# Patient Record
Sex: FEMALE | Race: WHITE | NOT HISPANIC OR LATINO | URBAN - METROPOLITAN AREA
[De-identification: names, ages, dates, MRNs, and addresses within clinical notes are randomized per-mention and may not be internally consistent; named-entity substitution may affect disease eponyms.]

---

## 2021-07-04 ENCOUNTER — EMERGENCY (EMERGENCY)
Facility: HOSPITAL | Age: 21
LOS: 1 days | Discharge: HOME | End: 2021-07-04
Attending: EMERGENCY MEDICINE | Admitting: EMERGENCY MEDICINE
Payer: COMMERCIAL

## 2021-07-04 VITALS
OXYGEN SATURATION: 99 % | HEART RATE: 67 BPM | TEMPERATURE: 98 F | SYSTOLIC BLOOD PRESSURE: 100 MMHG | RESPIRATION RATE: 18 BRPM | DIASTOLIC BLOOD PRESSURE: 62 MMHG

## 2021-07-04 LAB
APPEARANCE UR: CLEAR — SIGNIFICANT CHANGE UP
BACTERIA # UR AUTO: NEGATIVE — SIGNIFICANT CHANGE UP
BILIRUB UR-MCNC: NEGATIVE — SIGNIFICANT CHANGE UP
COLOR SPEC: YELLOW — SIGNIFICANT CHANGE UP
DIFF PNL FLD: NEGATIVE — SIGNIFICANT CHANGE UP
EPI CELLS # UR: 1 /HPF — SIGNIFICANT CHANGE UP (ref 0–5)
GLUCOSE UR QL: NEGATIVE — SIGNIFICANT CHANGE UP
HIV 1+2 AB+HIV1 P24 AG SERPL QL IA: SIGNIFICANT CHANGE UP
HYALINE CASTS # UR AUTO: 0 /LPF — SIGNIFICANT CHANGE UP (ref 0–7)
KETONES UR-MCNC: NEGATIVE — SIGNIFICANT CHANGE UP
LEUKOCYTE ESTERASE UR-ACNC: ABNORMAL
NITRITE UR-MCNC: NEGATIVE — SIGNIFICANT CHANGE UP
PH UR: 6.5 — SIGNIFICANT CHANGE UP (ref 5–8)
PROT UR-MCNC: NEGATIVE — SIGNIFICANT CHANGE UP
RBC CASTS # UR COMP ASSIST: 3 /HPF — SIGNIFICANT CHANGE UP (ref 0–4)
SP GR SPEC: 1.01 — LOW (ref 1.01–1.02)
UROBILINOGEN FLD QL: SIGNIFICANT CHANGE UP
WBC UR QL: 4 /HPF — SIGNIFICANT CHANGE UP (ref 0–5)

## 2021-07-04 PROCEDURE — 99284 EMERGENCY DEPT VISIT MOD MDM: CPT

## 2021-07-04 RX ORDER — FLUCONAZOLE 150 MG/1
150 TABLET ORAL ONCE
Refills: 0 | Status: COMPLETED | OUTPATIENT
Start: 2021-07-04 | End: 2021-07-04

## 2021-07-04 RX ORDER — CEFTRIAXONE 500 MG/1
500 INJECTION, POWDER, FOR SOLUTION INTRAMUSCULAR; INTRAVENOUS ONCE
Refills: 0 | Status: COMPLETED | OUTPATIENT
Start: 2021-07-04 | End: 2021-07-04

## 2021-07-04 RX ADMIN — FLUCONAZOLE 150 MILLIGRAM(S): 150 TABLET ORAL at 15:22

## 2021-07-04 RX ADMIN — Medication 100 MILLIGRAM(S): at 15:15

## 2021-07-04 RX ADMIN — CEFTRIAXONE 500 MILLIGRAM(S): 500 INJECTION, POWDER, FOR SOLUTION INTRAMUSCULAR; INTRAVENOUS at 15:22

## 2021-07-04 NOTE — ED PROVIDER NOTE - NS ED ROS FT
Gen: Denies fever  CV: Denies chest pain, palpitations  Resp: Denies SOB, cough  Endo: Denies sensitivity to heat, cold, increased urination  GI: Denies diarrhea, constipation, nausea, vomiting  Msk: Denies back pain, LE swelling, extremity pain  : Denies increased frequency  Neuro: Denies LOC, weakness, numbness/tingling

## 2021-07-04 NOTE — ED PROVIDER NOTE - GENITOURINARY VAGINAL DISCHARGE
Creamy discharge with cottage-like/CREAMY Creamy discharge near cervix with cottage-cheese like discharge in vaginal vault/CREAMY

## 2021-07-04 NOTE — ED PROVIDER NOTE - NSFOLLOWUPINSTRUCTIONS_ED_ALL_ED_FT
What is a yeast infection? A yeast infection, or vaginal candidiasis, is a common vaginal infection. A yeast infection is caused by a fungus, or yeast-like germ. Fungi are normally found in your vagina. Too many fungi can cause an infection.    What increases my risk for a yeast infection?   •Pregnancy      •Medicines, such as antibiotics, birth control pills, or steroid medicine      •Medical conditions, such as diabetes      •Contraceptive devices, such as diaphragms, sponges, and intrauterine devices      What are the signs and symptoms of a yeast infection?   •Thick, white, cheese-like discharge from your vagina      •Itching, swelling, and redness in your vagina      •Pain or burning when you urinate      •Pain during sexual intercourse      How is a yeast infection diagnosed and treated?   •Your healthcare provider will ask about your medical history and examine you. A sample of your vaginal discharge may show what germ is causing your infection.      •Medicines help treat the fungal infection and decrease inflammation. The medicine may be a pill, cream, ointment, or vaginal tablet or suppository. With treatment, the infection is usually gone within a week.      What can I do to keep my vagina healthy?   •Clean your genital area with mild soap and warm water each day. Do not get soap inside your vagina. Gently dry the area after washing. Do not use hot tubs. The heat and moisture from hot tubs can increase your risk for another yeast infection.      •Always wipe from front to back after you use the toilet. This prevents spreading bacteria from your rectal area into your vagina.      •Do not wear tight-fitting clothes or undergarments for long periods of time. Wear cotton underwear during the day. Cotton helps keep your genital area dry and does not hold in warmth or moisture. Do not wear underwear at night.      •Do not douche or use feminine hygiene sprays or bubble bath. Do not use pads or tampons that are scented, or colored or perfumed toilet paper.      •Do not have sex until your symptoms go away. Have your partner wear a condom until you complete your course of medication.      •Ask your healthcare provider about birth control options if necessary. Condoms have latex and diaphragms have gel that kills sperm. Both of these may irritate your genital area.      When should I call my doctor or gynecologist?   •You have a fever and chills.      •You develop abdominal or pelvic pain.      •Your discharge is bloody and it is not your monthly period.      •Your signs and symptoms get worse, even after treatment.      •You have questions or concerns about your condition or care.    What is gonorrhea? Gonorrhea is a sexually transmitted infection (STI) caused by bacteria. Gonorrhea is spread during oral, vaginal, or anal sex. The infection most often affects the urethra, rectum, or throat. The urethra is the tube that carries urine from your bladder to the outside of your body. Anyone with multiple sex partners is at higher risk for gonorrhea.    What are the signs and symptoms of gonorrhea?   •Feeling like you need to urinate more often than usual      •Pain or burning when you urinate      •Pain in your lower abdomen, penis, or vagina      •Pain when you have sex      •Thick, yellow-green discharge coming from your penis, rectum, or vagina      •Sore throat or swollen lymph nodes in your neck      •Fever      How is gonorrhea diagnosed? Your healthcare provider will ask you questions about your health and sexual history. He or she will need to know when your symptoms started. Tell your provider about any STIs you or your partner may have. You may need any of the following:   •Blood or urine tests may show the bacteria that causes gonorrhea.      •A sample of discharge may help providers know what treatment is best for you.      How is gonorrhea treated? Antibiotics help treat the infection caused by bacteria. Both you and your sex partner need treatment to prevent gonorrhea from spreading.    How can I prevent the spread of gonorrhea and other STIs? Ask your healthcare provider for more information about the following safe sex practices:  •Use a male or female condom during sex. This includes oral, genital, or anal sex. Use a new condom each time. Condoms help prevent pregnancy and STIs. Use latex condoms, if possible. Lambskin (also called sheepskin or natural membrane) condoms do not protect against STIs. A polyurethane condom can be used if you or your partner is allergic to latex. Condoms should be used with a second form of birth control to help prevent pregnancy and STIs. Do not use male and female condoms together. Ask for more information about the correct way to use condoms.      •Limit your number of sex partners. This will help lower your risk for gonorrhea and other STIs.      •Do not have sex with someone who has an STI. This includes oral, vaginal, and anal sex.      •Do not have sex while you or your partner are being treated. Ask when it is safe to have sex.       •Ask about medicines to lower your risk for some STIs: ?Vaccines can help protect you from hepatitis A, hepatitis B, and the human papillomavirus (HPV). The HPV vaccine is usually given at 11 years, but it may be given through 26 years to both females and males. Your provider can give you more information on vaccines to prevent STIs.      ?Pre-exposure prophylaxis (PrEP) may be given if you are at high risk for HIV. PrEP is taken every day to prevent the virus from fully infecting the body.      •If you are a woman: ?Do not douche. Douching upsets the normal balance of bacteria found in your vagina. It does not prevent or clear up vaginal infections.      ?Tell your healthcare provider if you are pregnant. Gonorrhea can be passed to an infant during birth.        When should I call my doctor?   •You have pain and swelling in your scrotum.      •You have pain in your abdomen or joints.      •You have a fever.      •You have chills, a cough, or feel weak and achy.      •You have questions or concerns about your condition or care.    What is chlamydia? Chlamydia is a sexually transmitted infection (STI) caused by bacteria. Chlamydia is spread during oral, vaginal, or anal sex. The infection most often affects the urethra, rectum, or throat. The urethra is the tube that carries urine from your bladder to the outside of your body. Anyone with multiple sex partners is at higher risk for chlamydia. Your risk is also increased if you have another STI, such as gonorrhea.    What are the signs and symptoms of chlamydia?   •Vaginal redness or itching      •Thick, yellow-green discharge coming from your penis, rectum, or vagina      •Feeling like you need to urinate more often than usual      •Pain or burning when you urinate      •Pain when you have sex      •Pain in your lower abdomen, penis, or vagina.       •Sore throat or swollen lymph nodes in your neck      •Fever      How is chlamydia diagnosed? Your healthcare provider will ask you questions about your health and sexual history. He or she will need to know when your symptoms started. Tell your provider about any STIs you or your partner may have. You may need any of the following:  •Blood or urine tests may show the bacteria that causes chlamydia.      •A sample of discharge may help providers know what treatment is best for you.      How is chlamydia treated? Antibiotics help treat the infection caused by bacteria. Both you and your sex partner need treatment to prevent chlamydia from spreading.    How can I prevent the spread of chlamydia and other STIs? Ask your healthcare provider for more information about the following safe sex practices:  •Use a male or female condom during sex. This includes oral, genital, or anal sex. Use a new condom each time. Condoms help prevent pregnancy and STIs. Use latex condoms, if possible. Lambskin (also called sheepskin or natural membrane) condoms do not protect against STIs. A polyurethane condom can be used if you or your partner is allergic to latex. Condoms should be used with a second form of birth control to help prevent pregnancy and STIs. Do not use male and female condoms together. Ask for more information about the correct way to use condoms.      •Limit your number of sex partners. This will help lower your risk for chlamydia and other STIs.      •Do not have sex with someone who has an STI. This includes oral, vaginal, and anal sex.      •Do not have sex while you or your partner are being treated. Ask when it is safe to have sex.      •Ask about medicines to lower your risk for some STIs: ?Vaccines can help protect you from hepatitis A, hepatitis B, and the human papillomavirus (HPV). The HPV vaccine is usually given at 11 years, but it may be given through 26 years to both females and males. Your provider can give you more information on vaccines to prevent STIs.      ?Pre-exposure prophylaxis (PrEP) may be given if you are at high risk for HIV. PrEP is taken every day to prevent the virus from fully infecting the body.      •If you are a woman: ?Do not douche. Douching upsets the normal balance of bacteria found in your vagina. It does not prevent or clear up vaginal infections.      ?Tell your healthcare provider if you are pregnant. Gonorrhea can be passed to an infant during birth.        When should I call my doctor?   •You have a fever.      •You have nausea or you cannot stop vomiting.      •You have severe abdominal pain.      •Your signs or symptoms last longer than 1 week or get worse during treatment.      •Your signs or symptoms return after treatment.      •You have pain during sex.      •You have questions or concerns about your condition or care.

## 2021-07-04 NOTE — ED PROVIDER NOTE - ATTENDING CONTRIBUTION TO CARE
21 year old with vaginal discharge after unprotected sexual encoutner. concern for yeast vs gc. will treat both. send ua. fu gyn

## 2021-07-04 NOTE — ED PROVIDER NOTE - PATIENT PORTAL LINK FT
You can access the FollowMyHealth Patient Portal offered by [facility] by registering at the following website: [email or web address]. By joining [employer]’s FollowMyHealth portal, you will also be able to view your health information using other applications (apps) compatible with our system.

## 2021-07-04 NOTE — ED PROVIDER NOTE - OBJECTIVE STATEMENT
20 y/o female with no pmhx presenting with vaginal itching and discharge. Patient had unprotected sex on Monday and Tuesday (not on OCPs) and started to develop vaginal itching and discharge on Thursday. Was using monostat and went to urgent care yesterday who prescribed macrobid. Patient went to wipe yesterday and felt irritation and noticed blood on the toilet paper. No history of STIs. Reports burning with urination and denies fevers/chills, n/v/d, cough, rashes.

## 2021-07-04 NOTE — ED PROVIDER NOTE - CLINICAL SUMMARY MEDICAL DECISION MAKING FREE TEXT BOX
22 y/o female with no pmhx presenting with vaginal itching and discharge after unprotected sex with PE consistent with candida. Will also treat for GC given unprotected and creamy discharge surrounding cervix. Upreg and reassess.

## 2021-07-04 NOTE — ED PROVIDER NOTE - PHYSICAL EXAMINATION
Gen: WDWN, NAD  HEENT: EOMI, no nasal discharge, mucous membranes moist  CV: RRR, +S1/S2, no M/R/G  Resp: CTAB, no W/R/R  GI: Abdomen soft non-distended, NTTP, no masses/organomegaly   MSK: No CVA tenderness, no open wounds, no bruising, no LE edema  Neuro: A&Ox4, following commands, moving all four extremities spontaneously  Psych: appropriate mood

## 2021-07-04 NOTE — ED ADULT TRIAGE NOTE - CHIEF COMPLAINT QUOTE
Pt complaining of vaginal bleeding and discharge. Pt states she had unprotected sex on monday and tuesday. Pt denies fever or chills.

## 2021-07-05 LAB
N GONORRHOEA RRNA SPEC QL NAA+PROBE: SIGNIFICANT CHANGE UP
SPECIMEN SOURCE: SIGNIFICANT CHANGE UP

## 2022-12-25 ENCOUNTER — EMERGENCY (EMERGENCY)
Facility: HOSPITAL | Age: 22
LOS: 1 days | Discharge: HOME | End: 2022-12-25
Attending: STUDENT IN AN ORGANIZED HEALTH CARE EDUCATION/TRAINING PROGRAM
Payer: COMMERCIAL

## 2022-12-25 VITALS
RESPIRATION RATE: 16 BRPM | TEMPERATURE: 98 F | SYSTOLIC BLOOD PRESSURE: 115 MMHG | OXYGEN SATURATION: 100 % | HEART RATE: 92 BPM | DIASTOLIC BLOOD PRESSURE: 70 MMHG

## 2022-12-25 VITALS
WEIGHT: 160.06 LBS | RESPIRATION RATE: 18 BRPM | SYSTOLIC BLOOD PRESSURE: 105 MMHG | HEIGHT: 65 IN | TEMPERATURE: 98 F | HEART RATE: 108 BPM | DIASTOLIC BLOOD PRESSURE: 77 MMHG | OXYGEN SATURATION: 96 %

## 2022-12-25 LAB
ALBUMIN SERPL ELPH-MCNC: 4.2 G/DL — SIGNIFICANT CHANGE UP (ref 3.5–5)
ALP SERPL-CCNC: 59 U/L — SIGNIFICANT CHANGE UP (ref 40–120)
ALT FLD-CCNC: 31 U/L DA — SIGNIFICANT CHANGE UP (ref 10–60)
ANION GAP SERPL CALC-SCNC: 15 MMOL/L — SIGNIFICANT CHANGE UP (ref 5–17)
APPEARANCE UR: CLEAR — SIGNIFICANT CHANGE UP
AST SERPL-CCNC: 15 U/L — SIGNIFICANT CHANGE UP (ref 10–40)
BACTERIA # UR AUTO: ABNORMAL /HPF
BASOPHILS # BLD AUTO: 0.04 K/UL — SIGNIFICANT CHANGE UP (ref 0–0.2)
BASOPHILS NFR BLD AUTO: 0.4 % — SIGNIFICANT CHANGE UP (ref 0–2)
BILIRUB SERPL-MCNC: 0.9 MG/DL — SIGNIFICANT CHANGE UP (ref 0.2–1.2)
BILIRUB UR-MCNC: NEGATIVE — SIGNIFICANT CHANGE UP
BUN SERPL-MCNC: 20 MG/DL — HIGH (ref 7–18)
CALCIUM SERPL-MCNC: 9.9 MG/DL — SIGNIFICANT CHANGE UP (ref 8.4–10.5)
CHLORIDE SERPL-SCNC: 104 MMOL/L — SIGNIFICANT CHANGE UP (ref 96–108)
CO2 SERPL-SCNC: 24 MMOL/L — SIGNIFICANT CHANGE UP (ref 22–31)
COLOR SPEC: YELLOW — SIGNIFICANT CHANGE UP
CREAT SERPL-MCNC: 1.07 MG/DL — SIGNIFICANT CHANGE UP (ref 0.5–1.3)
DIFF PNL FLD: NEGATIVE — SIGNIFICANT CHANGE UP
EGFR: 76 ML/MIN/1.73M2 — SIGNIFICANT CHANGE UP
EOSINOPHIL # BLD AUTO: 0.06 K/UL — SIGNIFICANT CHANGE UP (ref 0–0.5)
EOSINOPHIL NFR BLD AUTO: 0.6 % — SIGNIFICANT CHANGE UP (ref 0–6)
EPI CELLS # UR: ABNORMAL /HPF
FLUAV AG NPH QL: SIGNIFICANT CHANGE UP
FLUBV AG NPH QL: SIGNIFICANT CHANGE UP
GLUCOSE SERPL-MCNC: 156 MG/DL — HIGH (ref 70–99)
GLUCOSE UR QL: NEGATIVE — SIGNIFICANT CHANGE UP
HCG SERPL-ACNC: <1 MIU/ML — SIGNIFICANT CHANGE UP
HCT VFR BLD CALC: 43.7 % — SIGNIFICANT CHANGE UP (ref 34.5–45)
HGB BLD-MCNC: 15.2 G/DL — SIGNIFICANT CHANGE UP (ref 11.5–15.5)
IMM GRANULOCYTES NFR BLD AUTO: 0.2 % — SIGNIFICANT CHANGE UP (ref 0–0.9)
KETONES UR-MCNC: ABNORMAL
LEUKOCYTE ESTERASE UR-ACNC: NEGATIVE — SIGNIFICANT CHANGE UP
LIDOCAIN IGE QN: 94 U/L — SIGNIFICANT CHANGE UP (ref 73–393)
LYMPHOCYTES # BLD AUTO: 0.31 K/UL — LOW (ref 1–3.3)
LYMPHOCYTES # BLD AUTO: 3.2 % — LOW (ref 13–44)
MCHC RBC-ENTMCNC: 31.3 PG — SIGNIFICANT CHANGE UP (ref 27–34)
MCHC RBC-ENTMCNC: 34.8 GM/DL — SIGNIFICANT CHANGE UP (ref 32–36)
MCV RBC AUTO: 89.9 FL — SIGNIFICANT CHANGE UP (ref 80–100)
MONOCYTES # BLD AUTO: 0.76 K/UL — SIGNIFICANT CHANGE UP (ref 0–0.9)
MONOCYTES NFR BLD AUTO: 7.9 % — SIGNIFICANT CHANGE UP (ref 2–14)
NEUTROPHILS # BLD AUTO: 8.44 K/UL — HIGH (ref 1.8–7.4)
NEUTROPHILS NFR BLD AUTO: 87.7 % — HIGH (ref 43–77)
NITRITE UR-MCNC: NEGATIVE — SIGNIFICANT CHANGE UP
NRBC # BLD: 0 /100 WBCS — SIGNIFICANT CHANGE UP (ref 0–0)
PH UR: 8 — SIGNIFICANT CHANGE UP (ref 5–8)
PLATELET # BLD AUTO: 205 K/UL — SIGNIFICANT CHANGE UP (ref 150–400)
POTASSIUM SERPL-MCNC: 3.8 MMOL/L — SIGNIFICANT CHANGE UP (ref 3.5–5.3)
POTASSIUM SERPL-SCNC: 3.8 MMOL/L — SIGNIFICANT CHANGE UP (ref 3.5–5.3)
PROT SERPL-MCNC: 8 G/DL — SIGNIFICANT CHANGE UP (ref 6–8.3)
PROT UR-MCNC: 15
RBC # BLD: 4.86 M/UL — SIGNIFICANT CHANGE UP (ref 3.8–5.2)
RBC # FLD: 11.6 % — SIGNIFICANT CHANGE UP (ref 10.3–14.5)
RBC CASTS # UR COMP ASSIST: SIGNIFICANT CHANGE UP /HPF (ref 0–2)
SARS-COV-2 RNA SPEC QL NAA+PROBE: SIGNIFICANT CHANGE UP
SODIUM SERPL-SCNC: 143 MMOL/L — SIGNIFICANT CHANGE UP (ref 135–145)
SP GR SPEC: 1.01 — SIGNIFICANT CHANGE UP (ref 1.01–1.02)
UROBILINOGEN FLD QL: NEGATIVE — SIGNIFICANT CHANGE UP
WBC # BLD: 9.57 K/UL — SIGNIFICANT CHANGE UP (ref 3.8–10.5)
WBC # FLD AUTO: 9.57 K/UL — SIGNIFICANT CHANGE UP (ref 3.8–10.5)
WBC UR QL: SIGNIFICANT CHANGE UP /HPF (ref 0–5)

## 2022-12-25 PROCEDURE — 99284 EMERGENCY DEPT VISIT MOD MDM: CPT

## 2022-12-25 PROCEDURE — 83690 ASSAY OF LIPASE: CPT

## 2022-12-25 PROCEDURE — 81001 URINALYSIS AUTO W/SCOPE: CPT

## 2022-12-25 PROCEDURE — 99284 EMERGENCY DEPT VISIT MOD MDM: CPT | Mod: 25

## 2022-12-25 PROCEDURE — 96361 HYDRATE IV INFUSION ADD-ON: CPT

## 2022-12-25 PROCEDURE — 87086 URINE CULTURE/COLONY COUNT: CPT

## 2022-12-25 PROCEDURE — 96374 THER/PROPH/DIAG INJ IV PUSH: CPT

## 2022-12-25 PROCEDURE — 36415 COLL VENOUS BLD VENIPUNCTURE: CPT

## 2022-12-25 PROCEDURE — 85025 COMPLETE CBC W/AUTO DIFF WBC: CPT

## 2022-12-25 PROCEDURE — 80053 COMPREHEN METABOLIC PANEL: CPT

## 2022-12-25 PROCEDURE — 87637 SARSCOV2&INF A&B&RSV AMP PRB: CPT

## 2022-12-25 PROCEDURE — 84702 CHORIONIC GONADOTROPIN TEST: CPT

## 2022-12-25 PROCEDURE — 96375 TX/PRO/DX INJ NEW DRUG ADDON: CPT

## 2022-12-25 RX ORDER — FAMOTIDINE 10 MG/ML
20 INJECTION INTRAVENOUS ONCE
Refills: 0 | Status: COMPLETED | OUTPATIENT
Start: 2022-12-25 | End: 2022-12-25

## 2022-12-25 RX ORDER — SODIUM CHLORIDE 9 MG/ML
2000 INJECTION, SOLUTION INTRAVENOUS ONCE
Refills: 0 | Status: COMPLETED | OUTPATIENT
Start: 2022-12-25 | End: 2022-12-25

## 2022-12-25 RX ORDER — KETOROLAC TROMETHAMINE 30 MG/ML
15 SYRINGE (ML) INJECTION ONCE
Refills: 0 | Status: DISCONTINUED | OUTPATIENT
Start: 2022-12-25 | End: 2022-12-25

## 2022-12-25 RX ORDER — ACETAMINOPHEN 500 MG
1000 TABLET ORAL ONCE
Refills: 0 | Status: COMPLETED | OUTPATIENT
Start: 2022-12-25 | End: 2022-12-25

## 2022-12-25 RX ORDER — ONDANSETRON 8 MG/1
1 TABLET, FILM COATED ORAL
Qty: 24 | Refills: 0
Start: 2022-12-25 | End: 2022-12-28

## 2022-12-25 RX ORDER — ONDANSETRON 8 MG/1
4 TABLET, FILM COATED ORAL ONCE
Refills: 0 | Status: COMPLETED | OUTPATIENT
Start: 2022-12-25 | End: 2022-12-25

## 2022-12-25 RX ADMIN — FAMOTIDINE 20 MILLIGRAM(S): 10 INJECTION INTRAVENOUS at 05:49

## 2022-12-25 RX ADMIN — SODIUM CHLORIDE 2000 MILLILITER(S): 9 INJECTION, SOLUTION INTRAVENOUS at 08:05

## 2022-12-25 RX ADMIN — Medication 400 MILLIGRAM(S): at 09:47

## 2022-12-25 RX ADMIN — SODIUM CHLORIDE 2000 MILLILITER(S): 9 INJECTION, SOLUTION INTRAVENOUS at 05:53

## 2022-12-25 RX ADMIN — ONDANSETRON 4 MILLIGRAM(S): 8 TABLET, FILM COATED ORAL at 05:52

## 2022-12-25 RX ADMIN — Medication 15 MILLIGRAM(S): at 08:04

## 2022-12-25 RX ADMIN — Medication 15 MILLIGRAM(S): at 09:35

## 2022-12-25 NOTE — ED PROVIDER NOTE - NSFOLLOWUPINSTRUCTIONS_ED_ALL_ED_FT
Thank you for choosing [facility] for your health care.    You were seen for nausea vomiting and diarrhea.  This is most likely either an infectious cause in which case it should clear up in the next 3 to 5 days or if something you ate in which case it should clear up within 24 to 36 hours.  Please take the antinausea medication as prescribed and drink plenty of fluids.  Please follow-up with your primary care doctor in the next few days.  You can return to the emergency department if you have uncontrollable nausea or vomiting despite the medications as prescribed.

## 2022-12-25 NOTE — ED PROVIDER NOTE - PHYSICAL EXAMINATION
CONSTITUTIONAL: non-toxic, well appearing  SKIN: no rash, no petechiae.  EYES: pink conjunctiva, anicteric  NECK: Supple; no meningismus, no JVD  CARD: RRR, no murmurs, equal radial pulses bilaterally 2+  RESP: CTAB, no respiratory distress  ABD: Soft, tender over epigastrium, no lower abdominal tenderness, non-distended, no peritoneal signs  EXT: Normal ROM x4. No edema.  NEURO: Alert, oriented. Neuro exam nonfocal.   PSYCH: Cooperative, appropriate.

## 2022-12-25 NOTE — ED PROVIDER NOTE - OBJECTIVE STATEMENT
22-year-old female no prior past medical history presents with vomiting and diarrhea x5 hours.  Patient states she ate Chinese food and drink coffee for dinner, shortly after started experiencing bilateral upper abdominal cramping associated with multiple episodes of nonbloody nonbilious emesis and watery diarrhea.  Denies any fevers, chest pain, shortness of breath, bloody stools, black tarry stools, dysuria, numbness, weakness, or rash.  Denies any ill contacts.  Denies any history abdominal surgeries in the past.  Denies any additional complaints.

## 2022-12-25 NOTE — ED PROVIDER NOTE - PATIENT PORTAL LINK FT
You can access the FollowMyHealth Patient Portal offered by [facility] by registering at the following website: [email or web address]. By joining [employer]’s FollowMyHealth portal, you will also be able to view your health information using other applications (apps) compatible with our system.

## 2022-12-25 NOTE — ED PROVIDER NOTE - CLINICAL SUMMARY MEDICAL DECISION MAKING FREE TEXT BOX
Alisha: 22-year-old female no prior past medical history presents with vomiting and diarrhea x5 hours.  Patient states she ate Chinese food and drink coffee for dinner, shortly after started experiencing bilateral upper abdominal cramping associated with multiple episodes of nonbloody nonbilious emesis and watery diarrhea.  Denies any fevers, chest pain, shortness of breath, bloody stools, black tarry stools, dysuria, numbness, weakness, or rash.  Denies any ill contacts.  Denies any history abdominal surgeries in the past.  Abdomen with epigastric tenderness, unclear etiology, possible gastroenteritis. Will obtain labs, provide supportive treatment, PO trial with dispo pending workup; if no improvement, will consider advanced imaging.

## 2022-12-26 LAB
CULTURE RESULTS: SIGNIFICANT CHANGE UP
SPECIMEN SOURCE: SIGNIFICANT CHANGE UP

## 2023-05-29 NOTE — ED ADULT NURSE NOTE - CAS TRG GEN SKIN CONDITION
Outpt labs revealing hypertriglyceridemia and dyslipidemia  - on atorvastatin 40 mg qd for several years, not the etiology of transaminitis    > hold home statin Warm on home amlodipine and losartan  - hold losartan given RACHEL  - BP within acceptable ranges during admission    Plan:  > continue with home amlodipine